# Patient Record
Sex: FEMALE | Race: WHITE | URBAN - METROPOLITAN AREA
[De-identification: names, ages, dates, MRNs, and addresses within clinical notes are randomized per-mention and may not be internally consistent; named-entity substitution may affect disease eponyms.]

---

## 2020-07-25 ENCOUNTER — TELEPHONE ENCOUNTER (OUTPATIENT)
Dept: URBAN - METROPOLITAN AREA CLINIC 13 | Facility: CLINIC | Age: 30
End: 2020-07-25

## 2020-07-26 ENCOUNTER — TELEPHONE ENCOUNTER (OUTPATIENT)
Dept: URBAN - METROPOLITAN AREA CLINIC 13 | Facility: CLINIC | Age: 30
End: 2020-07-26

## 2022-10-04 ENCOUNTER — OFFICE VISIT (OUTPATIENT)
Dept: URBAN - METROPOLITAN AREA CLINIC 107 | Facility: CLINIC | Age: 32
End: 2022-10-04
Payer: COMMERCIAL

## 2022-10-04 ENCOUNTER — TELEPHONE ENCOUNTER (OUTPATIENT)
Dept: URBAN - METROPOLITAN AREA CLINIC 113 | Facility: CLINIC | Age: 32
End: 2022-10-04

## 2022-10-04 ENCOUNTER — WEB ENCOUNTER (OUTPATIENT)
Dept: URBAN - METROPOLITAN AREA CLINIC 107 | Facility: CLINIC | Age: 32
End: 2022-10-04

## 2022-10-04 VITALS
WEIGHT: 216.4 LBS | HEIGHT: 64 IN | DIASTOLIC BLOOD PRESSURE: 90 MMHG | TEMPERATURE: 98.1 F | BODY MASS INDEX: 36.95 KG/M2 | HEART RATE: 98 BPM | SYSTOLIC BLOOD PRESSURE: 115 MMHG

## 2022-10-04 DIAGNOSIS — R74.8 ABNORMAL LIVER ENZYMES: ICD-10-CM

## 2022-10-04 PROCEDURE — 99203 OFFICE O/P NEW LOW 30 MIN: CPT | Performed by: STUDENT IN AN ORGANIZED HEALTH CARE EDUCATION/TRAINING PROGRAM

## 2022-10-04 RX ORDER — LEVOTHYROXINE SODIUM 112 UG/1
1 TABLET IN THE MORNING ON AN EMPTY STOMACH TABLET ORAL ONCE A DAY
Status: ACTIVE | COMMUNITY

## 2022-10-04 NOTE — PHYSICAL EXAM GASTROINTESTINAL
Abdomen , soft, prior surgical scars noted, nontender, nondistended , no guarding or rigidity , no masses palpable , normal bowel sounds , Liver and Spleen,  no hepatosplenomegaly , liver nontender

## 2022-10-04 NOTE — HPI-TODAY'S VISIT:
Ms. Julien is a 32-year-old female with a past medical history significant for hypothyroidism, obesity, anxiety, GERD.  She is being referred by Dr. Albrecht for evaluation of abnormal liver enzymes. . Patient had blood work done in 2022 that revealed total bilirubin 0.7, alkaline phosphatase 163, AST 73, ALT 98.  CIRILO negative.  Smooth muscle antibody negative.  Ceruloplasmin level within normal limits.  Alpha-1 antitrypsin 197 [high].  Iron 43, ferritin 252.  Beta-hCG test positive.  WBC 9.0, hemoglobin 13.7, MCV 81.3, platelet 296.  Right upper quadrant ultrasound with elastography notable for hepatic steatosis with a shear wave velocity of 1.62.  This velocity suggests intermediate stiffness and underlying fibrosis. She notes that during this evaluation she also had symptoms related to the flu. . She was seen by her OB/GYN Dr. Juan Antonio Arellano in 2022.  Repeat LFTs at this time were normal. . Patient denies any prior liver disease history. She did have physiologic jaundice as a  that resolved. She denies family history of liver disease. She denies OTC or herbal supplements apart from prenatal vitamins. She had previously been trying to lose weight unsuccessfully. She has gained some weight since discovering she is pregnant.  She notes that she had a miscarriage in  and subsequently had issues related to her thyroid levels. Her TSH is now wnl on levothyroxine. . Current medications include Benadryl prn, levothyroxine, prenatal vitamin.

## 2023-01-13 ENCOUNTER — OFFICE VISIT (OUTPATIENT)
Dept: URBAN - METROPOLITAN AREA CLINIC 107 | Facility: CLINIC | Age: 33
End: 2023-01-13

## 2023-05-02 ENCOUNTER — WEB ENCOUNTER (OUTPATIENT)
Dept: URBAN - METROPOLITAN AREA CLINIC 107 | Facility: CLINIC | Age: 33
End: 2023-05-02

## 2023-05-02 ENCOUNTER — LAB OUTSIDE AN ENCOUNTER (OUTPATIENT)
Dept: URBAN - METROPOLITAN AREA CLINIC 107 | Facility: CLINIC | Age: 33
End: 2023-05-02

## 2023-05-02 ENCOUNTER — OFFICE VISIT (OUTPATIENT)
Dept: URBAN - METROPOLITAN AREA CLINIC 107 | Facility: CLINIC | Age: 33
End: 2023-05-02
Payer: COMMERCIAL

## 2023-05-02 ENCOUNTER — DASHBOARD ENCOUNTERS (OUTPATIENT)
Age: 33
End: 2023-05-02

## 2023-05-02 VITALS
DIASTOLIC BLOOD PRESSURE: 67 MMHG | HEIGHT: 64 IN | WEIGHT: 212 LBS | RESPIRATION RATE: 18 BRPM | HEART RATE: 84 BPM | TEMPERATURE: 96.6 F | BODY MASS INDEX: 36.19 KG/M2 | SYSTOLIC BLOOD PRESSURE: 114 MMHG

## 2023-05-02 DIAGNOSIS — R93.89 ABNORMAL ULTRASOUND: ICD-10-CM

## 2023-05-02 DIAGNOSIS — R74.8 ELEVATED LIVER ENZYMES: ICD-10-CM

## 2023-05-02 PROBLEM — 169255008: Status: ACTIVE | Noted: 2023-05-02

## 2023-05-02 PROCEDURE — 99213 OFFICE O/P EST LOW 20 MIN: CPT | Performed by: STUDENT IN AN ORGANIZED HEALTH CARE EDUCATION/TRAINING PROGRAM

## 2023-05-02 RX ORDER — LEVOTHYROXINE SODIUM 100 UG/1
1 TABLET IN THE MORNING ON AN EMPTY STOMACH TABLET ORAL ONCE A DAY
Status: ACTIVE | COMMUNITY

## 2023-05-02 NOTE — HPI-TODAY'S VISIT:
Initial visit 10/4/2022 Ms. Julien is a 32-year-old female with a past medical history significant for hypothyroidism, obesity, anxiety, GERD.  She is being referred by Dr. Albrecht for evaluation of abnormal liver enzymes. Patient had blood work done in 2022 that revealed total bilirubin 0.7, alkaline phosphatase 163, AST 73, ALT 98.  CIRILO negative.  Smooth muscle antibody negative.  Ceruloplasmin level within normal limits.  Alpha-1 antitrypsin 197 [high].  Iron 43, ferritin 252.  Beta-hCG test positive.  WBC 9.0, hemoglobin 13.7, MCV 81.3, platelet 296.  Right upper quadrant ultrasound with elastography notable for hepatic steatosis with a shear wave velocity of 1.62.  This velocity suggests intermediate stiffness and underlying fibrosis. She notes that during this evaluation she also had symptoms related to the flu. She was seen by her OB/GYN Dr. Juan Antonio Arellano in 2022.  Repeat LFTs at this time were normal. Patient denies any prior liver disease history. She did have physiologic jaundice as a  that resolved. She denies family history of liver disease. She denies OTC or herbal supplements apart from prenatal vitamins. She had previously been trying to lose weight unsuccessfully. She has gained some weight since discovering she is pregnant. She notes that she had a miscarriage in  and subsequently had issues related to her thyroid levels. Her TSH is now wnl on levothyroxine. Current medications include Benadryl prn, levothyroxine, prenatal vitamin. . Follow-up visit 23 Labs 2023: WBC 11.5, hemoglobin 11.9, platelet 300, HIV nonreactive, T4 within normal limit. Labs 2022: HBV surface antigen negative, HCV antibody negative. Patient deliever her baby girl 3 weeks earlier than expected secondary to pre-ecclampsia via . She had some post-partum complications including blood loss. She is doing well now and notes that her baby is healthy.

## 2023-10-05 ENCOUNTER — WEB ENCOUNTER (OUTPATIENT)
Dept: URBAN - METROPOLITAN AREA CLINIC 107 | Facility: CLINIC | Age: 33
End: 2023-10-05

## 2023-10-13 ENCOUNTER — LAB OUTSIDE AN ENCOUNTER (OUTPATIENT)
Dept: URBAN - METROPOLITAN AREA CLINIC 107 | Facility: CLINIC | Age: 33
End: 2023-10-13

## 2023-10-13 PROBLEM — 197321007: Status: ACTIVE | Noted: 2023-10-13

## 2023-11-09 ENCOUNTER — WEB ENCOUNTER (OUTPATIENT)
Dept: URBAN - METROPOLITAN AREA CLINIC 107 | Facility: CLINIC | Age: 33
End: 2023-11-09

## 2024-01-11 ENCOUNTER — TELEPHONE ENCOUNTER (OUTPATIENT)
Dept: URBAN - METROPOLITAN AREA CLINIC 107 | Facility: CLINIC | Age: 34
End: 2024-01-11